# Patient Record
Sex: MALE | Race: WHITE | URBAN - METROPOLITAN AREA
[De-identification: names, ages, dates, MRNs, and addresses within clinical notes are randomized per-mention and may not be internally consistent; named-entity substitution may affect disease eponyms.]

---

## 2017-01-03 RX ORDER — TRAMADOL HYDROCHLORIDE 50 MG/1
50 TABLET ORAL
Qty: 90 TAB | Refills: 0 | OUTPATIENT
Start: 2017-01-03 | End: 2017-01-04 | Stop reason: SDUPTHER

## 2017-01-03 NOTE — TELEPHONE ENCOUNTER
LAST OV 10/13/16  Last filled tramadol 12/5/16    Can't pull  because patient is getting medications filled in [place]

## 2017-01-04 RX ORDER — TRAMADOL HYDROCHLORIDE 50 MG/1
50 TABLET ORAL
Qty: 90 TAB | Refills: 0 | Status: SHIPPED | OUTPATIENT
Start: 2017-01-04 | End: 2017-03-01 | Stop reason: SDUPTHER

## 2017-01-09 RX ORDER — HYDROCODONE BITARTRATE AND ACETAMINOPHEN 7.5; 325 MG/1; MG/1
1 TABLET ORAL
Qty: 120 TAB | Refills: 0 | Status: SHIPPED | OUTPATIENT
Start: 2017-01-09 | End: 2017-02-08 | Stop reason: SDUPTHER

## 2017-02-08 RX ORDER — HYDROCODONE BITARTRATE AND ACETAMINOPHEN 7.5; 325 MG/1; MG/1
1 TABLET ORAL
Qty: 120 TAB | Refills: 0 | Status: SHIPPED | OUTPATIENT
Start: 2017-02-08 | End: 2017-03-13 | Stop reason: SDUPTHER

## 2017-03-01 RX ORDER — TRAMADOL HYDROCHLORIDE 50 MG/1
50 TABLET ORAL
Qty: 90 TAB | Refills: 0 | OUTPATIENT
Start: 2017-03-01 | End: 2017-03-31 | Stop reason: SDUPTHER

## 2017-03-13 RX ORDER — HYDROCODONE BITARTRATE AND ACETAMINOPHEN 7.5; 325 MG/1; MG/1
1 TABLET ORAL
Qty: 120 TAB | Refills: 0 | Status: SHIPPED | OUTPATIENT
Start: 2017-03-13 | End: 2017-04-12 | Stop reason: SDUPTHER

## 2017-03-31 RX ORDER — TRAMADOL HYDROCHLORIDE 50 MG/1
50 TABLET ORAL
Qty: 90 TAB | Refills: 0 | OUTPATIENT
Start: 2017-03-31 | End: 2017-05-02 | Stop reason: SDUPTHER

## 2017-03-31 NOTE — TELEPHONE ENCOUNTER
Attempted to locate patient in [facility] database, but unable. Patient not found. Reviewed chart and last given refill for Tramadol on 3/1/2017. Also on Norco which was refilled on 3/13/2017. Being treated for chronic back pain. Will need treatment contract addressed at visit on 4/12/2017.

## 2017-04-12 ENCOUNTER — OFFICE VISIT (OUTPATIENT)
Dept: INTERNAL MEDICINE CLINIC | Age: 52
End: 2017-04-12

## 2017-04-12 VITALS
HEART RATE: 79 BPM | SYSTOLIC BLOOD PRESSURE: 98 MMHG | WEIGHT: 130 LBS | TEMPERATURE: 98.2 F | OXYGEN SATURATION: 98 % | DIASTOLIC BLOOD PRESSURE: 70 MMHG | BODY MASS INDEX: 19.26 KG/M2 | HEIGHT: 69 IN | RESPIRATION RATE: 12 BRPM

## 2017-04-12 DIAGNOSIS — M54.50 CHRONIC MIDLINE LOW BACK PAIN WITHOUT SCIATICA: Primary | ICD-10-CM

## 2017-04-12 DIAGNOSIS — G89.29 CHRONIC MIDLINE LOW BACK PAIN WITHOUT SCIATICA: Primary | ICD-10-CM

## 2017-04-12 RX ORDER — HYDROCODONE BITARTRATE AND ACETAMINOPHEN 7.5; 325 MG/1; MG/1
1 TABLET ORAL
Qty: 120 TAB | Refills: 0 | Status: SHIPPED | OUTPATIENT
Start: 2017-04-12 | End: 2017-04-12 | Stop reason: SDUPTHER

## 2017-04-12 RX ORDER — HYDROCODONE BITARTRATE AND ACETAMINOPHEN 7.5; 325 MG/1; MG/1
1 TABLET ORAL
Qty: 120 TAB | Refills: 0 | Status: SHIPPED | OUTPATIENT
Start: 2017-04-12 | End: 2017-05-11 | Stop reason: SDUPTHER

## 2017-05-02 RX ORDER — TRAMADOL HYDROCHLORIDE 50 MG/1
50 TABLET ORAL
Qty: 90 TAB | Refills: 0 | OUTPATIENT
Start: 2017-05-02 | End: 2017-05-31 | Stop reason: SDUPTHER

## 2017-05-11 RX ORDER — HYDROCODONE BITARTRATE AND ACETAMINOPHEN 7.5; 325 MG/1; MG/1
1 TABLET ORAL
Qty: 120 TAB | Refills: 0 | Status: SHIPPED | OUTPATIENT
Start: 2017-05-11 | End: 2017-06-08 | Stop reason: SDUPTHER

## 2017-05-11 NOTE — TELEPHONE ENCOUNTER
Last ov 4/12/17  Last filled Norco on 4/12/17 per [address] [facility], no records on [address] [place] as pt lives in [place].

## 2017-05-31 RX ORDER — TRAMADOL HYDROCHLORIDE 50 MG/1
50 TABLET ORAL
Qty: 90 TAB | Refills: 0 | OUTPATIENT
Start: 2017-05-31 | End: 2017-06-26 | Stop reason: SDUPTHER

## 2017-06-08 RX ORDER — HYDROCODONE BITARTRATE AND ACETAMINOPHEN 7.5; 325 MG/1; MG/1
1 TABLET ORAL
Qty: 120 TAB | Refills: 0 | Status: SHIPPED | OUTPATIENT
Start: 2017-06-08 | End: 2017-07-10 | Stop reason: SDUPTHER

## 2017-06-26 RX ORDER — TRAMADOL HYDROCHLORIDE 50 MG/1
50 TABLET ORAL
Qty: 90 TAB | Refills: 0 | OUTPATIENT
Start: 2017-06-26 | End: 2017-07-24 | Stop reason: SDUPTHER

## 2017-07-10 RX ORDER — HYDROCODONE BITARTRATE AND ACETAMINOPHEN 7.5; 325 MG/1; MG/1
1 TABLET ORAL
Qty: 120 TAB | Refills: 0 | Status: SHIPPED | OUTPATIENT
Start: 2017-07-10 | End: 2017-08-09 | Stop reason: SDUPTHER

## 2017-07-24 RX ORDER — TRAMADOL HYDROCHLORIDE 50 MG/1
50 TABLET ORAL
Qty: 90 TAB | Refills: 0 | OUTPATIENT
Start: 2017-07-24 | End: 2017-08-21 | Stop reason: SDUPTHER

## 2017-08-09 ENCOUNTER — OFFICE VISIT (OUTPATIENT)
Dept: INTERNAL MEDICINE CLINIC | Age: 52
End: 2017-08-09

## 2017-08-09 ENCOUNTER — HOSPITAL ENCOUNTER (OUTPATIENT)
Dept: LAB | Age: 52
Discharge: HOME | End: 2017-08-09
Payer: MEDICARE

## 2017-08-09 VITALS
BODY MASS INDEX: 19.46 KG/M2 | TEMPERATURE: 98.2 F | OXYGEN SATURATION: 98 % | HEIGHT: 69 IN | HEART RATE: 76 BPM | WEIGHT: 131.4 LBS | SYSTOLIC BLOOD PRESSURE: 100 MMHG | DIASTOLIC BLOOD PRESSURE: 68 MMHG | RESPIRATION RATE: 12 BRPM

## 2017-08-09 DIAGNOSIS — R55 VASOVAGAL SYNCOPE: Primary | ICD-10-CM

## 2017-08-09 DIAGNOSIS — B36.0 TINEA VERSICOLOR: ICD-10-CM

## 2017-08-09 DIAGNOSIS — G89.29 CHRONIC MIDLINE LOW BACK PAIN WITHOUT SCIATICA: ICD-10-CM

## 2017-08-09 DIAGNOSIS — M54.50 CHRONIC MIDLINE LOW BACK PAIN WITHOUT SCIATICA: ICD-10-CM

## 2017-08-09 DIAGNOSIS — E78.5 HYPERLIPIDEMIA LDL GOAL <130: ICD-10-CM

## 2017-08-09 LAB
ALBUMIN SERPL BCP-MCNC: 3.7 G/DL (ref 3.4–5)
ALBUMIN/GLOB SERPL: 1.3 {RATIO} (ref 0.8–1.7)
ALP SERPL-CCNC: 95 U/L (ref 45–117)
ALT SERPL-CCNC: 18 U/L (ref 16–61)
ANION GAP BLD CALC-SCNC: 7 MMOL/L (ref 3–18)
AST SERPL W P-5'-P-CCNC: 14 U/L (ref 15–37)
BASOPHILS # BLD AUTO: 0.1 K/UL (ref 0–0.06)
BASOPHILS # BLD: 1 % (ref 0–2)
BILIRUB SERPL-MCNC: 0.2 MG/DL (ref 0.2–1)
BUN SERPL-MCNC: 16 MG/DL (ref 7–18)
BUN/CREAT SERPL: 16 (ref 12–20)
CALCIUM SERPL-MCNC: 9.5 MG/DL (ref 8.5–10.1)
CHLORIDE SERPL-SCNC: 106 MMOL/L (ref 100–108)
CHOLEST SERPL-MCNC: 167 MG/DL
CO2 SERPL-SCNC: 28 MMOL/L (ref 21–32)
CREAT SERPL-MCNC: 0.99 MG/DL (ref 0.6–1.3)
DIFFERENTIAL METHOD BLD: ABNORMAL
EOSINOPHIL # BLD: 0.5 K/UL (ref 0–0.4)
EOSINOPHIL NFR BLD: 6 % (ref 0–5)
ERYTHROCYTE [DISTWIDTH] IN BLOOD BY AUTOMATED COUNT: 14.2 % (ref 11.6–14.5)
GLOBULIN SER CALC-MCNC: 2.9 G/DL (ref 2–4)
GLUCOSE SERPL-MCNC: 101 MG/DL (ref 74–99)
HCT VFR BLD AUTO: 43.7 % (ref 36–48)
HDLC SERPL-MCNC: 66 MG/DL (ref 40–60)
HDLC SERPL: 2.5 {RATIO} (ref 0–5)
HGB BLD-MCNC: 14.3 G/DL (ref 13–16)
LDLC SERPL CALC-MCNC: 75.8 MG/DL (ref 0–100)
LIPID PROFILE,FLP: ABNORMAL
LYMPHOCYTES # BLD AUTO: 38 % (ref 21–52)
LYMPHOCYTES # BLD: 3 K/UL (ref 0.9–3.6)
MCH RBC QN AUTO: 32.6 PG (ref 24–34)
MCHC RBC AUTO-ENTMCNC: 32.7 G/DL (ref 31–37)
MCV RBC AUTO: 99.5 FL (ref 74–97)
MONOCYTES # BLD: 0.7 K/UL (ref 0.05–1.2)
MONOCYTES NFR BLD AUTO: 9 % (ref 3–10)
NEUTS SEG # BLD: 3.7 K/UL (ref 1.8–8)
NEUTS SEG NFR BLD AUTO: 46 % (ref 40–73)
PLATELET # BLD AUTO: 292 K/UL (ref 135–420)
PMV BLD AUTO: 10.5 FL (ref 9.2–11.8)
POTASSIUM SERPL-SCNC: 4.4 MMOL/L (ref 3.5–5.5)
PROT SERPL-MCNC: 6.6 G/DL (ref 6.4–8.2)
RBC # BLD AUTO: 4.39 M/UL (ref 4.7–5.5)
SODIUM SERPL-SCNC: 141 MMOL/L (ref 136–145)
TRIGL SERPL-MCNC: 126 MG/DL (ref ?–150)
VLDLC SERPL CALC-MCNC: 25.2 MG/DL
WBC # BLD AUTO: 7.9 K/UL (ref 4.6–13.2)

## 2017-08-09 PROCEDURE — 85025 COMPLETE CBC W/AUTO DIFF WBC: CPT | Performed by: INTERNAL MEDICINE

## 2017-08-09 PROCEDURE — 80061 LIPID PANEL: CPT | Performed by: INTERNAL MEDICINE

## 2017-08-09 PROCEDURE — 80053 COMPREHEN METABOLIC PANEL: CPT | Performed by: INTERNAL MEDICINE

## 2017-08-09 RX ORDER — HYDROCODONE BITARTRATE AND ACETAMINOPHEN 7.5; 325 MG/1; MG/1
1 TABLET ORAL
Qty: 120 TAB | Refills: 0 | Status: SHIPPED | OUTPATIENT
Start: 2017-08-09 | End: 2017-09-07 | Stop reason: SDUPTHER

## 2017-08-09 NOTE — MR AVS SNAPSHOT
Visit Information Date & Time Provider Department Dept. Phone Encounter #  
 8/9/2017  1:30 PM Brenda West MD Internist of [facility] [place] [id number] Follow-up Instructions Return in about 4 months (around 12/9/2017). Your Appointments 12/8/2017  2:00 PM  
Office Visit with Brenda West MD  
Internist of [facility]) Appt Note: ov 4mos. rm  
 [address], [address] [place]  
  
   
 [address], [address] Upcoming Health Maintenance Date Due DTaP/Tdap/Td series (1 - Tdap) 12/9/1986 FOBT Q 1 YEAR AGE 50-75 12/9/2015 MEDICARE YEARLY EXAM 11/20/2016 INFLUENZA AGE 9 TO ADULT 8/1/2017 Allergies as of 8/9/2017  Review Complete On: 8/9/2017 By: Brenda West MD  
  
 Severity Noted Reaction Type Reactions Morphine    Nausea Only Neomycin    Not Reported This Time Topically Other Medication    Not Reported This Time \"Allicon\" topically Current Immunizations  Reviewed on 10/13/2016 No immunizations on file. Not reviewed this visit You Were Diagnosed With   
  
 Codes Comments Vasovagal syncope    -  Primary ICD-10-CM: R55 
ICD-9-CM: 780. 2 Chronic midline low back pain without sciatica     ICD-10-CM: M54.5, G89.29 ICD-9-CM: 724.2, 338.29 Tinea versicolor     ICD-10-CM: B36.0 ICD-9-CM: 111.0 Hyperlipidemia LDL goal <130     ICD-10-CM: E78.5 ICD-9-CM: 272.4 Vitals BP Pulse Temp Resp Height(growth percentile) Weight(growth percentile) 100/68 76 98.2 °F (36.8 °C) (Oral) 12 5' 9\" (1.753 m) 131 lb 6.4 oz (59.6 kg) SpO2 BMI Smoking Status 98% 19.4 kg/m2 Current Every Day Smoker Vitals History BMI and BSA Data Body Mass Index Body Surface Area  
 19.4 kg/m 2 1.7 m 2 Preferred Pharmacy Pharmacy Name Phone [facility] [address] [place], [place] - [address] [facility] [phone] Your Updated Medication List  
  
   
This list is accurate as of: 8/9/17  2:40 PM.  Always use your most recent med list.  
  
  
  
  
 HYDROcodone-acetaminophen 7.5-325 mg per tablet Commonly known as:  Kristi Mitchell Take 1 Tab by mouth every six (6) hours as needed for Pain.  
  
 traMADol 50 mg tablet Commonly known as:  ULTRAM  
Take 1 Tab by mouth every six (6) hours as needed for Pain. Prescriptions Printed Refills HYDROcodone-acetaminophen (NORCO) 7.5-325 mg per tablet 0 Sig: Take 1 Tab by mouth every six (6) hours as needed for Pain. Class: Print Route: Oral  
  
Follow-up Instructions Return in about 4 months (around 12/9/2017). To-Do List   
 Around 08/09/2017 Lab:  CBC WITH AUTOMATED DIFF Around 08/09/2017 Lab:  LIPID PANEL Around 08/09/2017 Lab:  METABOLIC PANEL, COMPREHENSIVE Introducing [facility]! Pearl Velazquez introduces [employer] patient portal. Now you can access parts of your medical record, email your doctor's office, and request medication refills online. 1. In your internet browser, go to [email or web address] 2. Click on the First Time User? Click Here link in the Sign In box. You will see the New Member Sign Up page. 3. Enter your [employer] Access Code exactly as it appears below. You will not need to use this code after youve completed the sign-up process. If you do not sign up before the expiration date, you must request a new code. · [employer] Access Code: [email or web address] Expires: 11/7/2017  2:13 PM 
 
4. Enter the last four digits of your Social Security Number (xxxx) and Date of Birth (mm/dd/yyyy) as indicated and click Submit. You will be taken to the next sign-up page. 5. Create a [employer] ID.  This will be your [employer] login ID and cannot be changed, so think of one that is secure and easy to remember. 6. Create a [employer] password. You can change your password at any time. 7. Enter your Password Reset Question and Answer. This can be used at a later time if you forget your password. 8. Enter your e-mail address. You will receive e-mail notification when new information is available in [address] 9. Click Sign Up. You can now view and download portions of your medical record. 10. Click the Download Summary menu link to download a portable copy of your medical information. If you have questions, please visit the Frequently Asked Questions section of the [employer] website. Remember, [employer] is NOT to be used for urgent needs. For medical emergencies, dial 911. Now available from your iPhone and Android! Please provide this summary of care documentation to your next provider. Your primary care clinician is listed as Isaiah Singer. If you have any questions after today's visit, please call [phone].

## 2017-08-10 ENCOUNTER — TELEPHONE (OUTPATIENT)
Dept: INTERNAL MEDICINE CLINIC | Age: 52
End: 2017-08-10

## 2017-08-10 NOTE — PROGRESS NOTES
Please notify the patient that his chemistries and thyroid level and cholesterol are all in a good range.

## 2017-08-10 NOTE — PROGRESS NOTES
Henna Hendricks 1965, is a 46 y.o. male, who is seen today for reevaluation of chronic lumbar pain, chronic pain in both knees, cigarette smoker, and hyperlipidemia. He thinks is eating a pretty healthy diet and he is still smoking but would like to cut back on smoking. His breathing is about the same without chronic cough. No dyspnea. He does break out into a sweat sometimes if he walks rapidly but has no discomfort anywhere in his chest or any dyspnea. Past Medical History:   Diagnosis Date    Chronic back pain     failed two surgeries    Chronic pain     back pain status post two back surgeries, one anteriorly and one posteriorly    Tinea versicolor      Current Outpatient Prescriptions   Medication Sig Dispense Refill    HYDROcodone-acetaminophen (NORCO) 7.5-325 mg per tablet Take 1 Tab by mouth every six (6) hours as needed for Pain. 120 Tab 0    traMADol (ULTRAM) 50 mg tablet Take 1 Tab by mouth every six (6) hours as needed for Pain. 90 Tab 0     Visit Vitals    /68    Pulse 76    Temp 98.2 °F (36.8 °C) (Oral)    Resp 12    Ht 5' 9\" (1.753 m)    Wt 131 lb 6.4 oz (59.6 kg)    SpO2 98%    BMI 19.4 kg/m2     Carotids are 2+ without bruits. No adenopathy or thyromegaly. Lungs are clear to percussion. Somewhat diminished breath sounds with no wheezing or crackles. Heart reveals a regular rhythm with normal S1 and S2 no murmur gallop click or rub. Apical impulse is not palpable. Abdomen is soft and nontender with no hepatosplenomegaly or masses and no bruits. Extremities reveal no clubbing cyanosis or edema. Pulses are diminished in the feet and 2+ elsewhere. He has quite poor range of motion of his back and some crepitation with range of motion of the knees. Assessment: #1. Chronic lumbar pain unchanged, he will continue as needed tramadol and as needed hydrocodone.   He did fill out a pain management agreement and brought that in today, he agrees to abide by all of its articles. #2. Hyperlipidemia with lipids not checked in quite a while. We will check lab for him today. #4.  Cigarette smoker, he knows he should quit and he is starting to work a little more diligently on that. We will check lab now and follow-up in 4 months or sooner if needed. We will call him with lab results when available. Ramon Robles MD FACP    Please note: This document has been produced using voice recognition software. Unrecognized errors in transcription may be present.

## 2017-08-11 NOTE — TELEPHONE ENCOUNTER
----- Message from Teressa Barron MD sent at 8/10/2017  7:54 AM EDT -----  Please notify the patient that his chemistries and thyroid level and cholesterol are all in a good range.

## 2017-08-21 RX ORDER — TRAMADOL HYDROCHLORIDE 50 MG/1
50 TABLET ORAL
Qty: 90 TAB | Refills: 0 | OUTPATIENT
Start: 2017-08-21 | End: 2017-09-19 | Stop reason: SDUPTHER

## 2017-09-07 RX ORDER — HYDROCODONE BITARTRATE AND ACETAMINOPHEN 7.5; 325 MG/1; MG/1
1 TABLET ORAL
Qty: 120 TAB | Refills: 0 | Status: SHIPPED | OUTPATIENT
Start: 2017-09-07 | End: 2017-10-05 | Stop reason: SDUPTHER

## 2017-09-07 NOTE — TELEPHONE ENCOUNTER
Unable to obtain  because he lives in [place]. .. He does have a valid contract on file in media with . Do you want a UDS on him?    Last filled per CC 8/9/17

## 2017-09-19 RX ORDER — TRAMADOL HYDROCHLORIDE 50 MG/1
50 TABLET ORAL
Qty: 90 TAB | Refills: 0 | OUTPATIENT
Start: 2017-09-19

## 2017-10-05 ENCOUNTER — TELEPHONE (OUTPATIENT)
Dept: INTERNAL MEDICINE CLINIC | Age: 52
End: 2017-10-05

## 2017-10-05 DIAGNOSIS — Z79.899 CONTROLLED SUBSTANCE AGREEMENT SIGNED: ICD-10-CM

## 2017-10-05 DIAGNOSIS — Z79.899 CONTROLLED SUBSTANCE AGREEMENT SIGNED: Primary | ICD-10-CM

## 2017-10-05 RX ORDER — HYDROCODONE BITARTRATE AND ACETAMINOPHEN 7.5; 325 MG/1; MG/1
1 TABLET ORAL
Qty: 120 TAB | Refills: 0 | Status: SHIPPED | OUTPATIENT
Start: 2017-10-05

## 2017-10-05 NOTE — TELEPHONE ENCOUNTER
Last filled 9/7/17  Reviewed report generated by the [place]. Does not demonstrate aberrancies or inconsistencies with regard to the prescribing of controlled medications to this patient by other providers.   Contract on file, needs UDS, order entered

## 2017-10-05 NOTE — TELEPHONE ENCOUNTER
Call #  [phone]    RE:  Script that needs a urine test- he's 2 hours away and was going to have someone pick it up- can he do it next refill?

## 2018-10-11 ENCOUNTER — DOCUMENTATION ONLY (OUTPATIENT)
Dept: INTERNAL MEDICINE CLINIC | Age: 53
End: 2018-10-11

## 2018-10-11 NOTE — PROGRESS NOTES
Pt has transferred care to [facility], records request received which will be forwarded to Ci for processing

## 2019-10-31 NOTE — TELEPHONE ENCOUNTER
[facility] Pre-Visit Chart Review  For Appointment Scheduled on (11/4/19)    There are no preventive care reminders to display for this patient.